# Patient Record
Sex: FEMALE | Race: WHITE
[De-identification: names, ages, dates, MRNs, and addresses within clinical notes are randomized per-mention and may not be internally consistent; named-entity substitution may affect disease eponyms.]

---

## 2021-03-10 ENCOUNTER — HOSPITAL ENCOUNTER (EMERGENCY)
Dept: HOSPITAL 61 - ER | Age: 46
Discharge: HOME | End: 2021-03-10
Payer: SELF-PAY

## 2021-03-10 VITALS — DIASTOLIC BLOOD PRESSURE: 100 MMHG | SYSTOLIC BLOOD PRESSURE: 142 MMHG

## 2021-03-10 VITALS — HEIGHT: 55 IN | BODY MASS INDEX: 31.63 KG/M2 | WEIGHT: 136.69 LBS
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
## 2021-03-10 DIAGNOSIS — S80.862A: Primary | ICD-10-CM

## 2021-03-10 DIAGNOSIS — Z98.890: ICD-10-CM

## 2021-03-10 DIAGNOSIS — L03.116: ICD-10-CM

## 2021-03-10 DIAGNOSIS — F17.200: ICD-10-CM

## 2021-03-10 DIAGNOSIS — Z88.8: ICD-10-CM

## 2021-03-10 PROCEDURE — 90471 IMMUNIZATION ADMIN: CPT

## 2021-03-10 PROCEDURE — 99283 EMERGENCY DEPT VISIT LOW MDM: CPT

## 2021-03-10 PROCEDURE — 90715 TDAP VACCINE 7 YRS/> IM: CPT

## 2021-03-10 NOTE — PHYS DOC
Past Medical History


Past Medical History:  No Pertinent History


Past Surgical History:  Other


Additional Past Surgical Histo:  neck surgery


Smoking Status:  Current Every Day Smoker


Alcohol Use:  Occasionally


Drug Use:  None





General Adult


EDM:


Chief Complaint:  ABSCESS





HPI:


HPI:





Patient is a 45  year old female who presents to the ED today complaining of an 

infected wound on the left shin that she noted yesterday.  Patient states she 

does not know how it occurred, she does not know if she bumped her left shin on 

something while she was moving or something bit her.  Denies any fever.





Review of Systems:


Review of Systems:


Constitutional:   Denies fever or chills. []


Musculoskeletal:   Denies back pain or joint pain. [] 


Integument:   Reports left shin open wound


Neurologic:   Denies headache, focal weakness or sensory changes. [] ] 


Psychiatric:  Denies depression or anxiety. []





Heart Score:


C/O Chest Pain:  N/A


Risk Factors:


Risk Factors:  DM, Current or recent (<one month) smoker, HTN, HLP, family 

history of CAD, obesity.


Risk Scores:


Score 0 - 3:  2.5% MACE over next 6 weeks - Discharge Home


Score 4 - 6:  20.3% MACE over next 6 weeks - Admit for Clinical Observation


Score 7 - 10:  72.7% MACE over next 6 weeks - Early Invasive Strategies





Allergies:


Allergies:





Allergies








Coded Allergies Type Severity Reaction Last Updated Verified


 


  naproxen Allergy Severe anaphylaxis 10/19/14 Yes











Physical Exam:


PE:





Constitutional: Well developed, well nourished, no acute distress, non-toxic 

appearance. []


Skin: Left distal shin with an open wound roughly 2 x 1 cm with surrounding 

approximately 3 cm of cellulitis.  The wound is draining trace amount of yellow 

bloody material.  +2 left pedal pulse.  Cap refill less than 2 seconds to left 

toes.  Sensation intact to the left lower extremity.


Back: No tenderness, no CVA tenderness. [] 


Extremities: No tenderness, no cyanosis, no clubbing, ROM intact, no edema. [] 


Neurologic: Alert and oriented X 3, normal motor function, normal sensory 

function, no focal deficits noted. []


Psychologic: Affect normal, judgement normal, mood normal. []





EKG:


EKG:


[]





Radiology/Procedures:


Radiology/Procedures:


[]





Course & Med Decision Making:


Course & Med Decision Making


Pertinent Labs and Imaging studies reviewed. (See chart for details)





This is a 45-year-old female patient presenting to the ED today with an open 

wound with cellulitis of the left shin.  Tetanus was updated, discharged on 

clindamycin.  Follow-up with PCP.  Wound care instructions and return 

precautions provided





Dragon Disclaimer:


Dragon Disclaimer:


This electronic medical record was generated, in whole or in part, using a voice

 recognition dictation system.





Departure


Departure


Impression:  


   Primary Impression:  


   Cellulitis of lower extremity


   Qualified Codes:  L03.116 - Cellulitis of left lower limb


   Additional Impression:  


   Infected wound of left lower leg due to nonvenomous insect bite


Disposition:  01 DC HOME SELF CARE/HOMELESS


Condition:  STABLE


Referrals:  


NON,STAFF (PCP)


follow up with your doctor in 1-2 weeks


Patient Instructions:  Cellulitis, Easy-to-Read





Additional Instructions:  


You have a wound with cellulitis on your left shin.  Keep the area clean and 

dry.  You can wash the area with regular soap and water.  Please take the 

prescribed antibiotics until completed.  Come back to the ED at any point 

symptoms worsen.


Scripts


Clindamycin Hcl (CLINDAMYCIN HCL) 300 Mg Capsule


1 CAP PO TID, #[id number] CAP


   Prov: RODOLFO MARIE         3/10/21











RODOLFO MARIE              Mar 10, 2021 22:41